# Patient Record
Sex: MALE | Race: WHITE | Employment: FULL TIME | URBAN - METROPOLITAN AREA
[De-identification: names, ages, dates, MRNs, and addresses within clinical notes are randomized per-mention and may not be internally consistent; named-entity substitution may affect disease eponyms.]

---

## 2020-02-17 PROBLEM — K21.9 GASTROESOPHAGEAL REFLUX DISEASE, ESOPHAGITIS PRESENCE NOT SPECIFIED: Status: ACTIVE | Noted: 2020-02-17

## 2020-02-17 PROBLEM — R73.01 ELEVATED FASTING BLOOD SUGAR: Status: ACTIVE | Noted: 2020-02-17

## 2024-04-24 RX ORDER — ASPIRIN 81 MG/1
81 TABLET ORAL DAILY
COMMUNITY

## 2024-04-24 RX ORDER — ROSUVASTATIN CALCIUM 10 MG/1
10 TABLET, COATED ORAL NIGHTLY
COMMUNITY

## 2024-04-24 NOTE — PAT NURSING NOTE
Spoke with Javier RN at [facility]; discussed bloodwork not done since February and EKG on 3/6/24. Javier stated did not need to repeat EKG and pt should have labwork completed; she will place orders and call pt. Thanked Javier for her assistance.

## 2024-04-24 NOTE — PAT NURSING NOTE
Per PAT encounter/MyChart message sent to pt (Pt took notes as well):    PreOp Instructions     You are scheduled for: a Cardiac Procedure     Date of Procedure: 04/26/24 Friday     Diet Instructions: Do not eat or drink anything after midnight     Medications: Take Aspirin 81 mg x 4 tablets the day of your procedure, Medications you are allowed to take can be taken with a sip of water the morning of your procedure     Other Medications: Do NOT take your Sildenafil dose for 24 hours prior to the procedure 4/26.     Skin Prep: Shower with antibacterial soap using a clean washcloth, prior to procedure     Arrival Time: The day prior to your procedure (Thursday) you will receive a phone call before 6:00 pm with your arrival time. If you haven't received a phone call, please check your voicemail messages., If you did not receive a voice mail and it is after 6:00 pm, please call the nursing supervisor at [phone].    Driving After Procedure: If sedation is given, you WILL NOT be able to drive home. You will need a responsible adult  to drive you home., Cannot take uber or cab unless approved by physician     Discharge Teaching: Your nurse will give you specific instructions before discharge, Most people can resume normal activities in 2-3 days, Any questions, please call the physician's office      parking is available starting at 6 am or park in the [facility] parking garage at [facility]. Check in at the [facility] reception desk. Our  will be there to check you in for your procedure. Please bring your insurance cards and ID with you.                                                                                                                                      Please DO NOT respond to this message, the inbasket is not monitored for messages. For any questions, please call the physician's office.

## 2024-04-26 ENCOUNTER — HOSPITAL ENCOUNTER (OUTPATIENT)
Dept: INTERVENTIONAL RADIOLOGY/VASCULAR | Facility: HOSPITAL | Age: 59
Discharge: HOME | End: 2024-04-26
Attending: INTERNAL MEDICINE | Admitting: INTERNAL MEDICINE
Payer: COMMERCIAL

## 2024-04-26 VITALS
TEMPERATURE: 98 F | SYSTOLIC BLOOD PRESSURE: 105 MMHG | WEIGHT: 200 LBS | RESPIRATION RATE: 17 BRPM | DIASTOLIC BLOOD PRESSURE: 70 MMHG | BODY MASS INDEX: 26.51 KG/M2 | HEART RATE: 67 BPM | OXYGEN SATURATION: 95 % | HEIGHT: 73 IN

## 2024-04-26 DIAGNOSIS — I25.10 CORONARY ARTERIOSCLEROSIS: ICD-10-CM

## 2024-04-26 LAB
ATRIAL RATE: 62 BPM
P AXIS: 62 DEGREES
P-R INTERVAL: 206 MS
Q-T INTERVAL: 398 MS
QRS DURATION: 96 MS
QTC CALCULATION (BEZET): 403 MS
R AXIS: 68 DEGREES
T AXIS: 85 DEGREES
VENTRICULAR RATE: 62 BPM

## 2024-04-26 PROCEDURE — 93005 ELECTROCARDIOGRAM TRACING: CPT

## 2024-04-26 PROCEDURE — 99211 OFF/OP EST MAY X REQ PHY/QHP: CPT

## 2024-04-26 PROCEDURE — B2111ZZ FLUOROSCOPY OF MULTIPLE CORONARY ARTERIES USING LOW OSMOLAR CONTRAST: ICD-10-PCS | Performed by: INTERNAL MEDICINE

## 2024-04-26 PROCEDURE — 02703ZZ DILATION OF CORONARY ARTERY, ONE ARTERY, PERCUTANEOUS APPROACH: ICD-10-PCS | Performed by: INTERNAL MEDICINE

## 2024-04-26 PROCEDURE — B2151ZZ FLUOROSCOPY OF LEFT HEART USING LOW OSMOLAR CONTRAST: ICD-10-PCS | Performed by: INTERNAL MEDICINE

## 2024-04-26 PROCEDURE — 93458 L HRT ARTERY/VENTRICLE ANGIO: CPT | Performed by: INTERNAL MEDICINE

## 2024-04-26 PROCEDURE — 99152 MOD SED SAME PHYS/QHP 5/>YRS: CPT | Performed by: INTERNAL MEDICINE

## 2024-04-26 PROCEDURE — 92921 HC PTCA EA ADDL MAJOR CORONARY ARTERY/BRANCH: CPT | Performed by: INTERNAL MEDICINE

## 2024-04-26 PROCEDURE — 92928 PRQ TCAT PLMT NTRAC ST 1 LES: CPT | Performed by: INTERNAL MEDICINE

## 2024-04-26 PROCEDURE — 4A023N7 MEASUREMENT OF CARDIAC SAMPLING AND PRESSURE, LEFT HEART, PERCUTANEOUS APPROACH: ICD-10-PCS | Performed by: INTERNAL MEDICINE

## 2024-04-26 PROCEDURE — 99153 MOD SED SAME PHYS/QHP EA: CPT | Performed by: INTERNAL MEDICINE

## 2024-04-26 PROCEDURE — 027034Z DILATION OF CORONARY ARTERY, ONE ARTERY WITH DRUG-ELUTING INTRALUMINAL DEVICE, PERCUTANEOUS APPROACH: ICD-10-PCS | Performed by: INTERNAL MEDICINE

## 2024-04-26 PROCEDURE — 93010 ELECTROCARDIOGRAM REPORT: CPT | Performed by: INTERNAL MEDICINE

## 2024-04-26 RX ORDER — SODIUM CHLORIDE 9 MG/ML
INJECTION, SOLUTION INTRAVENOUS
Status: COMPLETED | OUTPATIENT
Start: 2024-04-27 | End: 2024-04-26

## 2024-04-26 RX ORDER — HEPARIN SODIUM 5000 [USP'U]/ML
INJECTION, SOLUTION INTRAVENOUS; SUBCUTANEOUS
Status: COMPLETED
Start: 2024-04-26 | End: 2024-04-26

## 2024-04-26 RX ORDER — ASPIRIN 81 MG/1
81 TABLET ORAL DAILY
Status: DISCONTINUED | OUTPATIENT
Start: 2024-04-27 | End: 2024-04-26

## 2024-04-26 RX ORDER — SODIUM CHLORIDE 9 MG/ML
INJECTION, SOLUTION INTRAVENOUS CONTINUOUS
Status: DISCONTINUED | OUTPATIENT
Start: 2024-04-26 | End: 2024-04-26

## 2024-04-26 RX ORDER — NITROGLYCERIN 20 MG/100ML
INJECTION INTRAVENOUS
Status: COMPLETED
Start: 2024-04-26 | End: 2024-04-26

## 2024-04-26 RX ORDER — LIDOCAINE HYDROCHLORIDE 10 MG/ML
INJECTION, SOLUTION EPIDURAL; INFILTRATION; INTRACAUDAL; PERINEURAL
Status: COMPLETED
Start: 2024-04-26 | End: 2024-04-26

## 2024-04-26 RX ORDER — ASPIRIN 81 MG/1
324 TABLET, CHEWABLE ORAL ONCE
Status: DISCONTINUED | OUTPATIENT
Start: 2024-04-26 | End: 2024-04-26 | Stop reason: HOSPADM

## 2024-04-26 RX ORDER — MIDAZOLAM HYDROCHLORIDE 1 MG/ML
INJECTION INTRAMUSCULAR; INTRAVENOUS
Status: COMPLETED
Start: 2024-04-26 | End: 2024-04-26

## 2024-04-26 RX ORDER — IODIXANOL 320 MG/ML
150 INJECTION, SOLUTION INTRAVASCULAR
Status: COMPLETED | OUTPATIENT
Start: 2024-04-26 | End: 2024-04-26

## 2024-04-26 RX ADMIN — IODIXANOL 250 ML: 320 INJECTION, SOLUTION INTRAVASCULAR at 09:22:00

## 2024-04-26 RX ADMIN — SODIUM CHLORIDE: 9 INJECTION, SOLUTION INTRAVENOUS at 07:42:00

## 2024-04-26 NOTE — DISCHARGE INSTRUCTIONS
HOME CARE INSTRUCTIONS FOLLOWING CORONARY ANGIOGRAPHY, PERIPHERAL ANGIOGRAPHY, ANGIOPLASTY (PTCA/PTA) OR INSERTION OF STENT IN THE CORONARY, CAROTID, AND/OR PERIPHERAL ARTERIES    ACTIVITY:  ~  DO NOT drive after the procedure. You may resume driving On Sunday.   ~  Plan on resting and relaxing tonight and tomorrow. You may resume your normal activity as tolerated after 48 hours.  ~  Do not lift anything heavy over 10 pounds for the next 48 hours and 20 pounds for a week for groin access.   ~ Avoid sexual activity for the next 24 hours.  ~ Avoid drinking alcohol for the next 24 hours.   ~ If the groin site was used, avoid repeated stair use and excessive walking.     WHAT IS NORMAL?  ~ A small lump at the procedure site associated with mild tenderness when touched  ~ The procedure site may be bruised or discolored.   ~ There may be a small amount of drainage on the bandage.     SPECIAL INSTRUCTIONS:   ~ Drink plenty of fluids during the next 24-48 hours to \"flush\" the contrast from your system.   ~ The bandage is to remain in place for 24 hours.  ~ Keep the bandage clean and dry.  ~ DO NOT submerge the procedure site for 72 hours (NO tub baths or pools) This includes dishwashing submersion if the wrist was used.   ~ After 24 hours, you MUST remove the bandage.  ~ You should shower AFTER removing the bandage and wash the procedure site GENTLY with soap and water. You do not have to cover it up. You may apply a bandaid for 24 hours if you prefer.     WHEN YOU SHOULD NOTIFY YOUR PHYSICIAN:  ~ Bleeding can occur at the procedure site. Both on the outside of the skin and/or beneath the surface of the skin.   ~ Swelling or a large lump at the procedure site can occur, which may be accompanied by moderate to severe pain.     IF EITHER OF THE ABOVE OCCURS, LIE DOWN FLAT, AND HAVE SOMEONE APPLY PRESSURE TO THE PROCEDURE SITE WITH BOTH HANDS AS INSTRUCTED BY YOUR NURSE. HOLD PRESSURE FOR 20 MINUTES AND THE BLEEDING SHOULD  STOP. NOTIFY YOUR PHYSICIAN OF THE OCCURRENCE.     IF THE BLEEDING DOES NOT STOP, CALL 911 AND CONTINUE TO APPLY PRESSURE     ~ If you experience signs of a fever, temperature greater than 101, chills, infection (redness, swelling, thick yellow drainage, or a foul odor from procedure site)   ~ If you notice any numbness, tingling or loss of feeling to your leg, foot  for groin access and loss of feeling to your fingers, or hand if the wrist was used.     IF YOU RECEIVED A STENT:    ~ You will remain on an antiplatelet drug and/or aspirin. Antiplatelet medications are usually taken for 6 mons to a year and you SHOULD NOT STOP TAKING THEM UNLESS YOUR CARDIOLOGIST DIRECTS YOU TO DO SO. These medications help to prevent blockage at the stent site. If another physician or dentist asks you to stop your antiplatelet medication, YOU NEED TO CONSULT YOUR CARDIOLOGIST FIRST. Together, your cardiologist and other physician can discuss the risks that may be involved if you are not taking the antiplatelet medication.   ~If an MRI is necessary, it may be done 4-6 weeks after your procedure. Verify this with your cardiologist.  ~ KEEP YOUR STENT CARD WITH YOU AT ALL TIMES. A duplicate card CANNOT be reproduced. If you need an MRI, your stent card will need to be shown to the technologist before performing the MRI.   ~ IF you have any issues or concerns for TONIGHT ONLY, you may call the nurse on call at [phone]. After tonight, follow up with your MDs office.     OTHER:   ~ You may resume your present diet and medications as prescribed unless otherwise directed by your physician.   ~ Follow up with Dr Douglas in 2 weeks, the office will call you for an appointment in 1-2 weeks  ~ Take Brilinta twice a day. Script at pharmacy.

## 2024-04-26 NOTE — CARDIAC REHAB
CAD education completed.  Stent card given.  Pt will either do CR here or [facility] or [facility].  Phone numbers given.

## 2024-04-26 NOTE — PROGRESS NOTES
Rc'd pt from cath lab s/p PCI in stable condition. VSS. Perclose to right groin is soft, clean and dry. No bleeding or hematoma. Pt denies c/o pain or discomfort. Dr Borrego and Dr Dior at bedside along with pts wife. 12L EKG done. Cardiac rehab notified.     13:30: Cardiac rehab at bedside. Pt tolerated po intake well. Bedrest completed. Pt ambulated and tolerated well. Groin stable. Voided. IV removed with catheter intact. Reviewed discharge instructions with pt and wife, verbalizes understanding. Home via w/c in stable condition without c/o. Pts wife is the .

## 2024-04-26 NOTE — PROCEDURES
[facility]    PATIENT'S NAME: RAJAT COWAN JR.   ATTENDING PHYSICIAN: Kar Osuna M.D.   OPERATING PHYSICIAN: Kar Osuna M.D.   PATIENT ACCOUNT#:   [id number]    LOCATION:  [address]  MEDICAL RECORD #:   [id number]       YOB: 1965  ADMISSION DATE:       04/26/2024      OPERATION DATE:  04/26/2024    CARDIAC PROCEDURE TRANSCRIPTION      CARDIAC CATHETERIZATION     PREOPERATIVE DIAGNOSIS:    POSTOPERATIVE DIAGNOSIS:    PROCEDURE PERFORMED:      CLINICAL SUMMARY:  The patient is a 59-year-old gentleman who has been having exertional chest discomfort.  A nuclear stress test demonstrated anterior ischemia.  He was advised to undergo cardiac catheterization.    DESCRIPTION OF PROCEDURE:  The procedure consisted of left heart catheterization, left ventriculography, and coronary angiography.  Moderate sedation was employed using 3 mg of IV Versed and 75 mcg of IV fentanyl.  A trained professional under my supervision and I observed the patient from 0803 until 0839 for a total of 36 minutes.  Hemodynamics were measured using a pigtail catheter advanced from the right femoral artery to the ascending aorta, across the aortic valve into the left ventricle.  Pressure was measured in each of those locations.  Selective angiography of the left and right coronary arteries was achieved using a 6-Luxembourgish 4 left and 6-Luxembourgish 4 right Francisco Javier catheter, respectively.    HEMODYNAMICS:  The left ventricular pressure is 108/14.  The AO is 109/68, with a mean of 86.    CORONARY ANGIOGRAPHY:  The left coronary artery is the dominant vessel.  The left main segment to the left coronary artery is normal.  The left circumflex artery is a large vessel.  It gives rise to 2 medium to large caliber obtuse marginal arteries, a medium-sized posterior left ventricular branch, and a medium to large caliber left posterior descending artery.  The left circumflex artery system is normal.  The  LAD is a large vessel.  It gives rise to a large diagonal branch.  In the proximal portion of the LAD, one sees a 95% stenosis.  This is located at the point of the diagonal artery origin.  The LAD is a additionally is 99% stenotic just after the takeoff of that diagonal artery.  JOEL 2 flow is present in the LAD beyond the stenosis.  The right coronary artery is a small nondominant vessel which is normal.    LEFT VENTRICULOGRAPHY:  The procedure was performed in the 30-degree LEGGETT projection.  This demonstrated minor hypokinesis of the mid anterior wall.  The ejection fraction is normal at 60%.    IMPRESSION:    1.   Normal left ventricular diastolic function.  2.   Minor segmental left ventricular systolic dysfunction.  3.   Coronary disease as described above involving the left anterior descending.    Dictated By Kar Osuna M.D.  d: 04/26/2024 09:24:30  t: 04/26/2024 16:11:13  [facility] [id number]  G/

## 2024-04-26 NOTE — DIETARY NOTE
Clinical Nutrition    Dietitian consult received per cardiac rehab standing order. Pt to be educated by cardiac rehab staff and encouraged to attend outpatient classes taught by RD. RD available PRN.    Francisca Medrano MS, RD, LDN  Clinical Dietitian  Ext: [phone]

## 2024-04-26 NOTE — PROCEDURES
[facility]       Nic Ayala Jr. Location: Cath Lab    [facility] [id number] MRN [id number]   Admission Date 4/26/2024 Procedure Date 4/26/2024   Attending Physician Joshua Osuna* Procedure Physician Javad Dior MD         CARDIAC CATHETERIZATION/PERCUTANEOUS CORONARY INTERVENTION      PREOPERATIVE DIAGNOSIS:  CAD, abnormal stress test  POSTOPERATIVE DIAGNOSIS:  S/P PCI to proximal LAD, CAD  PROCEDURE PERFORMED:  PCI to LAD and diagonal      PROCEDURE:  The patient was brought to the cardiac catheterization lab in the fasting state.  Informed consent was obtained.  Moderate sedation was employed using 3mg IV Versed and 25mcg IV fentanyl.  I directly observed the patient from 846 to 919, watching the heart rate, blood pressure, oximetry, and rhythm.  And an independent, trained observer was present throughout the procedure and assisted in the monitoring of the patient's level of consciousness and physiologic states.     ACCESS/CATHETER PLACEMENT:  6F right radial, slender sheath.  Andreia catheter for LM and RCA engagement.  Standard over the wire technique.  Standard Telugu and LEGGETT angiographic views. A TR band was used for arterial hemostasis.     INTERVENTIONAL PROCEDURE: Heparin for AC.  Upgraded to 7F.  Homer blue wire in LAD, [place] in diagonal.  Predilated with 2.0mm balloon to each vessel.  Stented with 4.0x18mm [place] at 14ATM.  Rewired diagonal and \"unjailed\" with 2.0mm balloon to 10ATM.  Posted LAD with 4.5mm NC to 16ATM.  NTG given IC for spasm.  Final angios taken.  Case ended.  Perclose for hemostasis.     MEDICATIONS:  See nursing record.     COMPLICATIONS:  None.     IMPRESSION:    99% proximal LAD stenosis involving first diagonal.  S/P PCI to LAD with RAN and PTCA to ostial diagonal with excellent result.    RECOMMENDATIONS:   DAPT (Brilinta for initial anti-platelet, switch to plavix if any issues).  Medically manage CAD.  Follow up with Dr. KINNEY.    LEOBARDO MULLINS

## 2024-04-29 ENCOUNTER — PATIENT OUTREACH (OUTPATIENT)
Dept: CASE MANAGEMENT | Age: 59
End: 2024-04-29

## 2024-04-29 NOTE — PROGRESS NOTES
[facility] apt request (discharged 04/26)    Dr José Antonio Osuna  Cardiology  [address]  [address]  [facility] [address]  [phone]  Follow up 2 weeks  Apt made: Mon 05/06 @3:40pm w/RUPALI Schneider  Confirmed w/pt  Closing encounter

## 2024-06-17 ENCOUNTER — ORDER TRANSCRIPTION (OUTPATIENT)
Dept: CARDIAC REHAB | Facility: HOSPITAL | Age: 59
End: 2024-06-17

## 2024-06-17 DIAGNOSIS — Z95.5 STENTED CORONARY ARTERY: Primary | ICD-10-CM

## 2024-06-20 ENCOUNTER — CARDPULM VISIT (OUTPATIENT)
Dept: CARDIAC REHAB | Facility: HOSPITAL | Age: 59
End: 2024-06-20
Attending: INTERNAL MEDICINE

## 2024-06-27 ENCOUNTER — CARDPULM VISIT (OUTPATIENT)
Dept: CARDIAC REHAB | Facility: HOSPITAL | Age: 59
End: 2024-06-27
Attending: INTERNAL MEDICINE

## 2024-06-27 PROCEDURE — 93798 PHYS/QHP OP CAR RHAB W/ECG: CPT

## 2024-07-01 ENCOUNTER — CARDPULM VISIT (OUTPATIENT)
Dept: CARDIAC REHAB | Facility: HOSPITAL | Age: 59
End: 2024-07-01
Attending: INTERNAL MEDICINE
Payer: COMMERCIAL

## 2024-07-01 PROCEDURE — 93798 PHYS/QHP OP CAR RHAB W/ECG: CPT

## 2024-07-03 ENCOUNTER — APPOINTMENT (OUTPATIENT)
Dept: CARDIAC REHAB | Facility: HOSPITAL | Age: 59
End: 2024-07-03
Payer: COMMERCIAL

## 2024-07-08 ENCOUNTER — CARDPULM VISIT (OUTPATIENT)
Dept: CARDIAC REHAB | Facility: HOSPITAL | Age: 59
End: 2024-07-08
Attending: INTERNAL MEDICINE
Payer: COMMERCIAL

## 2024-07-08 PROCEDURE — 93798 PHYS/QHP OP CAR RHAB W/ECG: CPT

## 2024-07-11 ENCOUNTER — APPOINTMENT (OUTPATIENT)
Dept: CARDIAC REHAB | Facility: HOSPITAL | Age: 59
End: 2024-07-11
Payer: COMMERCIAL

## 2024-07-18 ENCOUNTER — APPOINTMENT (OUTPATIENT)
Dept: CARDIAC REHAB | Facility: HOSPITAL | Age: 59
End: 2024-07-18
Payer: COMMERCIAL

## 2024-07-24 ENCOUNTER — APPOINTMENT (OUTPATIENT)
Dept: CARDIAC REHAB | Facility: HOSPITAL | Age: 59
End: 2024-07-24
Attending: INTERNAL MEDICINE
Payer: COMMERCIAL

## 2024-07-25 ENCOUNTER — APPOINTMENT (OUTPATIENT)
Dept: CARDIAC REHAB | Facility: HOSPITAL | Age: 59
End: 2024-07-25
Payer: COMMERCIAL

## 2024-08-01 ENCOUNTER — APPOINTMENT (OUTPATIENT)
Dept: CARDIAC REHAB | Facility: HOSPITAL | Age: 59
End: 2024-08-01
Payer: COMMERCIAL

## 2024-08-08 ENCOUNTER — APPOINTMENT (OUTPATIENT)
Dept: CARDIAC REHAB | Facility: HOSPITAL | Age: 59
End: 2024-08-08
Payer: COMMERCIAL

## 2024-08-14 ENCOUNTER — APPOINTMENT (OUTPATIENT)
Dept: CARDIAC REHAB | Facility: HOSPITAL | Age: 59
End: 2024-08-14
Attending: INTERNAL MEDICINE
Payer: COMMERCIAL

## 2024-08-15 ENCOUNTER — APPOINTMENT (OUTPATIENT)
Dept: CARDIAC REHAB | Facility: HOSPITAL | Age: 59
End: 2024-08-15
Payer: COMMERCIAL

## 2024-08-19 ENCOUNTER — APPOINTMENT (OUTPATIENT)
Dept: CARDIAC REHAB | Facility: HOSPITAL | Age: 59
End: 2024-08-19
Payer: COMMERCIAL

## 2024-08-21 ENCOUNTER — APPOINTMENT (OUTPATIENT)
Dept: CARDIAC REHAB | Facility: HOSPITAL | Age: 59
End: 2024-08-21
Payer: COMMERCIAL

## 2024-08-22 ENCOUNTER — APPOINTMENT (OUTPATIENT)
Dept: CARDIAC REHAB | Facility: HOSPITAL | Age: 59
End: 2024-08-22
Payer: COMMERCIAL

## 2024-08-26 ENCOUNTER — APPOINTMENT (OUTPATIENT)
Dept: CARDIAC REHAB | Facility: HOSPITAL | Age: 59
End: 2024-08-26
Payer: COMMERCIAL

## 2024-08-28 ENCOUNTER — APPOINTMENT (OUTPATIENT)
Dept: CARDIAC REHAB | Facility: HOSPITAL | Age: 59
End: 2024-08-28
Payer: COMMERCIAL

## 2024-08-29 ENCOUNTER — APPOINTMENT (OUTPATIENT)
Dept: CARDIAC REHAB | Facility: HOSPITAL | Age: 59
End: 2024-08-29
Payer: COMMERCIAL

## 2024-09-04 ENCOUNTER — APPOINTMENT (OUTPATIENT)
Dept: CARDIAC REHAB | Facility: HOSPITAL | Age: 59
End: 2024-09-04
Payer: COMMERCIAL

## 2024-09-05 ENCOUNTER — APPOINTMENT (OUTPATIENT)
Dept: CARDIAC REHAB | Facility: HOSPITAL | Age: 59
End: 2024-09-05
Payer: COMMERCIAL

## 2024-09-09 ENCOUNTER — APPOINTMENT (OUTPATIENT)
Dept: CARDIAC REHAB | Facility: HOSPITAL | Age: 59
End: 2024-09-09
Payer: COMMERCIAL

## 2024-09-11 ENCOUNTER — APPOINTMENT (OUTPATIENT)
Dept: CARDIAC REHAB | Facility: HOSPITAL | Age: 59
End: 2024-09-11
Payer: COMMERCIAL

## 2024-09-12 ENCOUNTER — APPOINTMENT (OUTPATIENT)
Dept: CARDIAC REHAB | Facility: HOSPITAL | Age: 59
End: 2024-09-12
Payer: COMMERCIAL

## 2024-09-16 ENCOUNTER — APPOINTMENT (OUTPATIENT)
Dept: CARDIAC REHAB | Facility: HOSPITAL | Age: 59
End: 2024-09-16
Payer: COMMERCIAL

## 2024-09-18 ENCOUNTER — APPOINTMENT (OUTPATIENT)
Dept: CARDIAC REHAB | Facility: HOSPITAL | Age: 59
End: 2024-09-18
Payer: COMMERCIAL

## 2024-09-19 ENCOUNTER — APPOINTMENT (OUTPATIENT)
Dept: CARDIAC REHAB | Facility: HOSPITAL | Age: 59
End: 2024-09-19
Payer: COMMERCIAL

## 2024-09-23 ENCOUNTER — APPOINTMENT (OUTPATIENT)
Dept: CARDIAC REHAB | Facility: HOSPITAL | Age: 59
End: 2024-09-23
Payer: COMMERCIAL

## 2024-09-25 ENCOUNTER — APPOINTMENT (OUTPATIENT)
Dept: CARDIAC REHAB | Facility: HOSPITAL | Age: 59
End: 2024-09-25
Payer: COMMERCIAL

## 2024-09-26 ENCOUNTER — APPOINTMENT (OUTPATIENT)
Dept: CARDIAC REHAB | Facility: HOSPITAL | Age: 59
End: 2024-09-26
Payer: COMMERCIAL